# Patient Record
Sex: MALE | Race: ASIAN | NOT HISPANIC OR LATINO | URBAN - METROPOLITAN AREA
[De-identification: names, ages, dates, MRNs, and addresses within clinical notes are randomized per-mention and may not be internally consistent; named-entity substitution may affect disease eponyms.]

---

## 2017-01-12 ENCOUNTER — APPOINTMENT (RX ONLY)
Dept: URBAN - METROPOLITAN AREA CLINIC 50 | Facility: CLINIC | Age: 32
Setting detail: DERMATOLOGY
End: 2017-01-12

## 2017-01-12 DIAGNOSIS — L29.89 OTHER PRURITUS: ICD-10-CM

## 2017-01-12 DIAGNOSIS — L73.2 HIDRADENITIS SUPPURATIVA: ICD-10-CM

## 2017-01-12 DIAGNOSIS — L29.8 OTHER PRURITUS: ICD-10-CM

## 2017-01-12 PROCEDURE — ? COUNSELING

## 2017-01-12 PROCEDURE — 99203 OFFICE O/P NEW LOW 30 MIN: CPT

## 2017-01-12 PROCEDURE — ? ORDER TESTS

## 2017-01-12 PROCEDURE — ? RECOMMENDATIONS

## 2017-01-12 PROCEDURE — ? PRESCRIPTION

## 2017-01-12 RX ORDER — HYDROCORTISONE 2.5 %
THIN LAYER CREAM (GRAM) TOPICAL BID
Qty: 1 | Refills: 1 | Status: ERX | COMMUNITY
Start: 2017-01-12

## 2017-01-12 RX ADMIN — Medication THIN LAYER: at 15:05

## 2017-01-12 ASSESSMENT — LOCATION DETAILED DESCRIPTION DERM
LOCATION DETAILED: LEFT ANTERIOR PROXIMAL THIGH
LOCATION DETAILED: RIGHT ANTERIOR MEDIAL PROXIMAL THIGH

## 2017-01-12 ASSESSMENT — LOCATION ZONE DERM: LOCATION ZONE: LEG

## 2017-01-12 ASSESSMENT — LOCATION SIMPLE DESCRIPTION DERM
LOCATION SIMPLE: RIGHT THIGH
LOCATION SIMPLE: LEFT THIGH

## 2017-01-12 NOTE — PROCEDURE: RECOMMENDATIONS
Recommendation Preamble: Please follow up if your condition fails to resolve or worsens or for skin growths that are enlarging, fail to heal, change shape/color or that are bleeding.\\n\\nIf any testing (biopsy, labs, etc...) was performed you should expect to hear from us within 1-2 weeks. If we have not contacted you please call to find out your results.\\n\\nIf you have any unanswered questions or new concerns we want you to let us know. Please call us if you have any questions.\\n\\nFor more information about your diagnosis we recommend the following resources:\\n\\[email or web address] \"Topics A-Z\"\\n\\[email or web address] \"For the Public: Dermatology A-Z\"\\n\\nwww.[email or web address] \"Disease Database\"
Detail Level: Zone

## 2017-01-17 ENCOUNTER — RX ONLY (OUTPATIENT)
Age: 32
Setting detail: RX ONLY
End: 2017-01-17

## 2017-01-17 RX ORDER — MINOCYCLINE HYDROCHLORIDE 100 MG/1
1 CAPSULE ORAL
Qty: 28 | Refills: 0 | Status: ERX | COMMUNITY
Start: 2017-01-17